# Patient Record
Sex: MALE | Race: WHITE | Employment: OTHER | URBAN - METROPOLITAN AREA
[De-identification: names, ages, dates, MRNs, and addresses within clinical notes are randomized per-mention and may not be internally consistent; named-entity substitution may affect disease eponyms.]

---

## 2022-11-22 PROBLEM — R97.20 ELEVATED PSA: Status: ACTIVE | Noted: 2022-11-22

## 2022-11-22 ASSESSMENT — ENCOUNTER SYMPTOMS
VOMITING: 0
NAUSEA: 0
EYE REDNESS: 0
SHORTNESS OF BREATH: 0
ABDOMINAL PAIN: 0
COUGH: 0
WHEEZING: 0
CONSTIPATION: 0
BACK PAIN: 0
COLOR CHANGE: 0

## 2022-11-22 NOTE — PROGRESS NOTES
HPI:          Patient is a 79 y.o. male in no acute distress. He is alert and oriented to person, place, and time. Patient being seen here today as a new patient. Patient was referred here for elevated PSA. Patient did have recent PSA value of 19.82. Patient has never had any PSA testing prior to this. He reports no new or worsening lower urinary tract symptoms. He has no issues with urgency and frequency. He has no gross hematuria dysuria. He reports no unintentional weight loss or decreased appetite. Patient has never seen urology in the past.  He has no significant family history of prostate cancer. He has no pain today. PSA  10/2022 - 19.82      Past Medical History:   Diagnosis Date    Elevated PSA      Past Surgical History:   Procedure Laterality Date    COLONOSCOPY      2005    FETAL SURGERY FOR CONGENITAL HERNIA Left     2012 Groin region    HERNIA REPAIR       Outpatient Encounter Medications as of 11/23/2022   Medication Sig Dispense Refill    Cyanocobalamin (VITAMIN B 12 PO) Take by mouth Indications: liquid      sulfamethoxazole-trimethoprim (BACTRIM DS;SEPTRA DS) 800-160 MG per tablet Take 1 tablet by mouth 2 times daily for 14 days 28 tablet 0     No facility-administered encounter medications on file as of 11/23/2022. Current Outpatient Medications on File Prior to Visit   Medication Sig Dispense Refill    Cyanocobalamin (VITAMIN B 12 PO) Take by mouth Indications: liquid       No current facility-administered medications on file prior to visit. Patient has no known allergies. Family History   Problem Relation Age of Onset    Other Father     No Known Problems Mother      Social History     Tobacco Use   Smoking Status Never   Smokeless Tobacco Never       Social History     Substance and Sexual Activity   Alcohol Use Yes    Comment: 2 beers per day       Review of Systems   Constitutional:  Negative for appetite change, chills and fever.    Eyes:  Negative for redness and visual disturbance. Respiratory:  Negative for cough, shortness of breath and wheezing. Cardiovascular:  Negative for chest pain and leg swelling. Gastrointestinal:  Negative for abdominal pain, constipation, nausea and vomiting. Genitourinary:  Negative for decreased urine volume, difficulty urinating, dysuria, enuresis, flank pain, frequency, hematuria, penile discharge, penile pain, scrotal swelling, testicular pain and urgency. Musculoskeletal:  Negative for back pain, joint swelling and myalgias. Skin:  Negative for color change, rash and wound. Neurological:  Negative for dizziness, tremors and numbness. Hematological:  Negative for adenopathy. Does not bruise/bleed easily. /78 (Site: Right Upper Arm, Position: Sitting, Cuff Size: Medium Adult)   Temp 97.5 °F (36.4 °C) (Infrared)   Ht 5' 9\" (1.753 m)   Wt 229 lb (103.9 kg)   BMI 33.82 kg/m²       PHYSICAL EXAM:  Constitutional: Patient in no acute distress; Neuro: alert and oriented to person place and time. Psych: Mood and affect normal.  Skin: Normal  Lungs: Respiratory effort normal  Cardiovascular:  Normal peripheral pulses  Abdomen: Soft, non-tender, non-distended with no CVA, flank pain  Bladder non-tender and not distended. Lymphatics: no palpable lymphadenopathy  Penis normal  Urethral meatus normal  Scrotal exam normal  Testicles normal bilaterally  Epididymis normal bilaterally  No evidence of inguinal hernia      No results found for: BUN  No results found for: CREATININE  No results found for: PSA    ASSESSMENT:  This is a 79 y.o. male with the following diagnoses:   Diagnosis Orders   1. Elevated PSA  PSA, Diagnostic    sulfamethoxazole-trimethoprim (BACTRIM DS;SEPTRA DS) 800-160 MG per tablet      2.  BPH with obstruction/lower urinary tract symptoms  CT MEASUREMENT,POST-VOID RESIDUAL VOLUME BY US,NON-IMAGING    PSA, Diagnostic    sulfamethoxazole-trimethoprim (BACTRIM DS;SEPTRA DS) 800-160 MG per tablet           PLAN:  We discussed several options today. At this point time patient would like to try 2 weeks of antibiotics as well as a repeat PSA in approximately 4 weeks.

## 2022-11-23 ENCOUNTER — OFFICE VISIT (OUTPATIENT)
Dept: UROLOGY | Age: 67
End: 2022-11-23
Payer: MEDICARE

## 2022-11-23 VITALS
HEIGHT: 69 IN | TEMPERATURE: 97.5 F | BODY MASS INDEX: 33.92 KG/M2 | DIASTOLIC BLOOD PRESSURE: 78 MMHG | WEIGHT: 229 LBS | SYSTOLIC BLOOD PRESSURE: 122 MMHG

## 2022-11-23 DIAGNOSIS — N13.8 BPH WITH OBSTRUCTION/LOWER URINARY TRACT SYMPTOMS: ICD-10-CM

## 2022-11-23 DIAGNOSIS — R97.20 ELEVATED PSA: Primary | ICD-10-CM

## 2022-11-23 DIAGNOSIS — N40.1 BPH WITH OBSTRUCTION/LOWER URINARY TRACT SYMPTOMS: ICD-10-CM

## 2022-11-23 PROCEDURE — 3017F COLORECTAL CA SCREEN DOC REV: CPT | Performed by: UROLOGY

## 2022-11-23 PROCEDURE — 1123F ACP DISCUSS/DSCN MKR DOCD: CPT | Performed by: UROLOGY

## 2022-11-23 PROCEDURE — PBSHW PR MEASUREMENT,POST-VOID RESIDUAL VOLUME BY US,NON-IMAGING: Performed by: UROLOGY

## 2022-11-23 PROCEDURE — 1036F TOBACCO NON-USER: CPT | Performed by: UROLOGY

## 2022-11-23 PROCEDURE — 51798 US URINE CAPACITY MEASURE: CPT | Performed by: UROLOGY

## 2022-11-23 PROCEDURE — 99204 OFFICE O/P NEW MOD 45 MIN: CPT | Performed by: UROLOGY

## 2022-11-23 PROCEDURE — G8417 CALC BMI ABV UP PARAM F/U: HCPCS | Performed by: UROLOGY

## 2022-11-23 PROCEDURE — G8484 FLU IMMUNIZE NO ADMIN: HCPCS | Performed by: UROLOGY

## 2022-11-23 PROCEDURE — G8427 DOCREV CUR MEDS BY ELIG CLIN: HCPCS | Performed by: UROLOGY

## 2022-11-23 RX ORDER — SULFAMETHOXAZOLE AND TRIMETHOPRIM 800; 160 MG/1; MG/1
1 TABLET ORAL 2 TIMES DAILY
Qty: 28 TABLET | Refills: 0 | Status: SHIPPED | OUTPATIENT
Start: 2022-11-23 | End: 2022-12-07

## 2022-11-23 NOTE — PATIENT INSTRUCTIONS
SURVEY:    You may be receiving a survey from [employer] regarding your visit today. Please complete the survey to enable us to provide the highest quality of care to you and your family. If you cannot score us a very good on any question, please call the office to discuss how we could have made your experience a very good one. Thank you.

## 2022-12-29 ENCOUNTER — OFFICE VISIT (OUTPATIENT)
Dept: UROLOGY | Age: 67
End: 2022-12-29
Payer: MEDICARE

## 2022-12-29 VITALS — DIASTOLIC BLOOD PRESSURE: 85 MMHG | HEART RATE: 67 BPM | SYSTOLIC BLOOD PRESSURE: 127 MMHG

## 2022-12-29 DIAGNOSIS — R97.20 ELEVATED PSA: Primary | ICD-10-CM

## 2022-12-29 PROCEDURE — 1036F TOBACCO NON-USER: CPT | Performed by: NURSE PRACTITIONER

## 2022-12-29 PROCEDURE — 99211 OFF/OP EST MAY X REQ PHY/QHP: CPT

## 2022-12-29 PROCEDURE — G8484 FLU IMMUNIZE NO ADMIN: HCPCS | Performed by: NURSE PRACTITIONER

## 2022-12-29 PROCEDURE — G8417 CALC BMI ABV UP PARAM F/U: HCPCS | Performed by: NURSE PRACTITIONER

## 2022-12-29 PROCEDURE — 3017F COLORECTAL CA SCREEN DOC REV: CPT | Performed by: NURSE PRACTITIONER

## 2022-12-29 PROCEDURE — 99214 OFFICE O/P EST MOD 30 MIN: CPT | Performed by: NURSE PRACTITIONER

## 2022-12-29 PROCEDURE — 1123F ACP DISCUSS/DSCN MKR DOCD: CPT | Performed by: NURSE PRACTITIONER

## 2022-12-29 PROCEDURE — G8427 DOCREV CUR MEDS BY ELIG CLIN: HCPCS | Performed by: NURSE PRACTITIONER

## 2022-12-29 RX ORDER — SULFAMETHOXAZOLE AND TRIMETHOPRIM 800; 160 MG/1; MG/1
1 TABLET ORAL 2 TIMES DAILY
Qty: 6 TABLET | Refills: 0 | Status: SHIPPED | OUTPATIENT
Start: 2022-12-29 | End: 2023-01-01

## 2022-12-29 ASSESSMENT — ENCOUNTER SYMPTOMS
BACK PAIN: 0
WHEEZING: 0
ABDOMINAL PAIN: 0
VOMITING: 0
EYE REDNESS: 0
NAUSEA: 0
COLOR CHANGE: 0
COUGH: 0
SHORTNESS OF BREATH: 0
CONSTIPATION: 0

## 2022-12-29 NOTE — PROGRESS NOTES
HPI:          Patient is a 79 y.o. male in no acute distress. He is alert and oriented to person, place, and time. History  11/2022 referred for elevated PSA. He reports no new or worsening lower urinary tract symptoms. He has no issues with urgency and frequency. He has no gross hematuria dysuria. He reports no unintentional weight loss or decreased appetite. Patient has never seen urology in the past.  He has no significant family history of prostate cancer. He has no pain today. PSA  12/2022 - 14.90  10/2022 - 19.82    Today  Here today to follow-up for elevated PSA. He did complete a course of antibiotics for a PSA of 19.82. Unfortunately, his PSA only declined to 14.90. He denies unintentional weight loss, decreased energy or appetite, new or worsening bone/hip/back pain. He denies any lower extremity numbness and tingling. He denies new or worsening LUTS. He denies gross hematuria or dysuria. Past Medical History:   Diagnosis Date    Elevated PSA      Past Surgical History:   Procedure Laterality Date    COLONOSCOPY      2005    FETAL SURGERY FOR CONGENITAL HERNIA Left     2012 Groin region    HERNIA REPAIR       Outpatient Encounter Medications as of 12/29/2022   Medication Sig Dispense Refill    sulfamethoxazole-trimethoprim (BACTRIM DS;SEPTRA DS) 800-160 MG per tablet Take 1 tablet by mouth 2 times daily for 3 days Start the day before biopsy 6 tablet 0    Cyanocobalamin (VITAMIN B 12 PO) Take by mouth Indications: liquid       No facility-administered encounter medications on file as of 12/29/2022. Current Outpatient Medications on File Prior to Visit   Medication Sig Dispense Refill    Cyanocobalamin (VITAMIN B 12 PO) Take by mouth Indications: liquid       No current facility-administered medications on file prior to visit. Patient has no known allergies.   Family History   Problem Relation Age of Onset    Other Father     No Known Problems Mother      Social History Tobacco Use   Smoking Status Never   Smokeless Tobacco Never       Social History     Substance and Sexual Activity   Alcohol Use Yes    Comment: 2 beers per day       Review of Systems   Constitutional:  Negative for appetite change, chills and fever. Eyes:  Negative for redness and visual disturbance. Respiratory:  Negative for cough, shortness of breath and wheezing. Cardiovascular:  Negative for chest pain and leg swelling. Gastrointestinal:  Negative for abdominal pain, constipation, nausea and vomiting. Genitourinary:  Negative for decreased urine volume, difficulty urinating, dysuria, enuresis, flank pain, frequency, hematuria, penile discharge, penile pain, scrotal swelling, testicular pain and urgency. Musculoskeletal:  Negative for back pain, joint swelling and myalgias. Skin:  Negative for color change, rash and wound. Neurological:  Negative for dizziness, tremors and numbness. Hematological:  Negative for adenopathy. Does not bruise/bleed easily. /85 (Site: Right Upper Arm, Position: Sitting, Cuff Size: Large Adult)   Pulse 67       PHYSICAL EXAM:  Constitutional: Patient in no acute distress; Neuro: alert and oriented to person place and time. Psych: Mood and affect normal.  Skin: Normal  Lungs: Respiratory effort normal      No results found for: BUN  No results found for: CREATININE  No results found for: PSA    ASSESSMENT:   Diagnosis Orders   1. Elevated PSA              PLAN:  We discussed that due to his elevated PSA we recommend a prostate biopsy. The patient was told this was typically done with ultrasound guidance and a prostate block with local anesthesia to minimize the discomfort. I discussed the risks including infection, bleeding, hematospermia and rarely sepsis. He was encouraged to give himself a Fleets enema the night prior to the biopsy and to take his antibiotics as instructed.   He was told to stop taking ASA and other blood thinners at least a week prior to the biopsy. He was told to go the ER if he experiences fever 100.1F or greater, chills or severe bleeding after the biopsy. Patient amenable to schedule biopsy.

## 2023-01-12 ENCOUNTER — HOSPITAL ENCOUNTER (OUTPATIENT)
Age: 68
Setting detail: SPECIMEN
Discharge: HOME | End: 2023-01-12
Payer: MEDICARE

## 2023-01-12 ENCOUNTER — PROCEDURE VISIT (OUTPATIENT)
Dept: UROLOGY | Age: 68
End: 2023-01-12

## 2023-01-12 VITALS
TEMPERATURE: 98.6 F | HEIGHT: 69 IN | WEIGHT: 223 LBS | SYSTOLIC BLOOD PRESSURE: 115 MMHG | DIASTOLIC BLOOD PRESSURE: 76 MMHG | HEART RATE: 83 BPM | BODY MASS INDEX: 33.03 KG/M2

## 2023-01-12 DIAGNOSIS — R97.20 ELEVATED PSA: ICD-10-CM

## 2023-01-12 DIAGNOSIS — R97.20 ELEVATED PSA: Primary | ICD-10-CM

## 2023-01-12 PROCEDURE — 88305 TISSUE EXAM BY PATHOLOGIST: CPT

## 2023-01-12 ASSESSMENT — ENCOUNTER SYMPTOMS
COUGH: 0
BACK PAIN: 0
VOMITING: 0
EYE REDNESS: 0
WHEEZING: 0
ABDOMINAL PAIN: 0
SHORTNESS OF BREATH: 0
NAUSEA: 0
COLOR CHANGE: 0
CONSTIPATION: 0

## 2023-01-12 NOTE — PROGRESS NOTES
The following was used during the prostate biopsy without adverse affects:    Schuyler [place]aks  Lot number [id number]  Expiration date 2025-05-31      BIOPSY GUIDE  Lot number [id number]  Expiration date 2025-03-31      LIDOCAINE 2% INJECTABLE  Lot number [id number]  Expiration date 08/25

## 2023-01-12 NOTE — PATIENT INSTRUCTIONS
SURVEY:    You may be receiving a survey from [email or web address] regarding your visit today. Please complete the survey to enable us to provide the highest quality of care to you and your family. If you cannot score us a very good on any question, please call the office to discuss how we could have made your experience a very good one. Thank you. GABI    Thank you for enrolling in [address] Please follow the instructions below to securely access your online medical record. [employer] allows you to send messages to your doctor, view your test results, renew your prescriptions, schedule appointments, and more. How Do I Sign Up? In your Internet browser, go to [email or web address]  Click on the Sign Up Now link in the Sign In box. You will see the New Member Sign Up page. Enter your [employer] Access Code exactly as it appears below. You will not need to use this code after youve completed the sign-up process. If you do not sign up before the expiration date, you must request a new code. [employer] Access Code: [email or web address]  Expires: 2/24/2023  7:03 AM    Enter your Social Security Number ([id number]) and Date of Birth (mm/dd/yyyy) as indicated and click Submit. You will be taken to the next sign-up page. Create a [employer] ID. This will be your [employer] login ID and cannot be changed, so think of one that is secure and easy to remember. Create a [employer] password. You can change your password at any time. Enter your Password Reset Question and Answer. This can be used at a later time if you forget your password. Enter your e-mail address. You will receive e-mail notification when new information is available in [address] Click Sign Up. You can now view your medical record. Additional Information  If you have questions, please contact your physician practice where you receive care. Remember, [employer] is NOT to be used for urgent needs. For medical emergencies, dial 911.

## 2023-01-13 ENCOUNTER — TELEPHONE (OUTPATIENT)
Dept: UROLOGY | Age: 68
End: 2023-01-13

## 2023-01-13 LAB — SURGICAL PATHOLOGY REPORT: NORMAL

## 2023-01-13 RX ORDER — CIPROFLOXACIN 500 MG/1
500 TABLET, FILM COATED ORAL 2 TIMES DAILY
Qty: 14 TABLET | Refills: 0 | Status: SHIPPED | OUTPATIENT
Start: 2023-01-13 | End: 2023-01-20

## 2023-01-13 RX ORDER — SULFAMETHOXAZOLE AND TRIMETHOPRIM 800; 160 MG/1; MG/1
TABLET ORAL
COMMUNITY
Start: 2023-01-05

## 2023-01-13 NOTE — TELEPHONE ENCOUNTER
Contacted the patient and advised of the provider's response/phone message. Patient voiced understanding. He mentioned he has already completed all but 1 of the Bactrim tabs. He questions the need to pickup the prescription for the Cipro. Advised the patient the providers would be informed, he voiced understanding.

## 2023-01-13 NOTE — TELEPHONE ENCOUNTER
Patient contacted the office today questioning if he should be placed on a different antibiotic following the biopsy procedure. Yesterday following the procedure he was vomiting around 11 am and then again around 6/7 pm. He mentioned he had taken the medication on an empty stomach both times when he was experiencing vomiting. No symptoms of fever or chills. He went on to explain he had been taking the antibiotic- Bactrim 800 mg x 1 week prior to the procedure. He had not previously experienced problems with vomiting while on the medication. He did mention symptoms of fatigue and lethargy. Should patient stop the Bactrim and be placed on something different? [facility] [address]. Please advise. Thank You!

## 2023-01-13 NOTE — TELEPHONE ENCOUNTER
Please let him know that we are putting him on additional antibiotics because he told us that he was feeling fatigued and had lethargy. We do not want him to have an infection after this procedure.

## 2023-01-13 NOTE — TELEPHONE ENCOUNTER
Please let him know to stop the Bactrim. We do have concern that he does have lethargy and fatigue. We will place him on a 7-day course of Cipro. Please take the Cipro with food. If he develops fever and chills he needs to go to the emergency room. Please have him give us a call early next week to let us know how he is doing.

## 2023-01-20 ENCOUNTER — OFFICE VISIT (OUTPATIENT)
Dept: UROLOGY | Age: 68
End: 2023-01-20

## 2023-01-20 VITALS
TEMPERATURE: 97.7 F | HEIGHT: 69 IN | BODY MASS INDEX: 33.18 KG/M2 | SYSTOLIC BLOOD PRESSURE: 124 MMHG | DIASTOLIC BLOOD PRESSURE: 82 MMHG | WEIGHT: 224 LBS

## 2023-01-20 DIAGNOSIS — C61 PROSTATE CANCER (HCC): Primary | ICD-10-CM

## 2023-01-20 ASSESSMENT — ENCOUNTER SYMPTOMS
EYE REDNESS: 0
NAUSEA: 0
ABDOMINAL PAIN: 0
BACK PAIN: 0
WHEEZING: 0
COUGH: 0
SHORTNESS OF BREATH: 0
COLOR CHANGE: 0
VOMITING: 0
CONSTIPATION: 0

## 2023-01-20 NOTE — PATIENT INSTRUCTIONS
SURVEY:    You may be receiving a survey from [employer] regarding your visit today. Please complete the survey to enable us to provide the highest quality of care to you and your family. If you cannot score us a very good on any question, please call the office to discuss how we could have made your experience a very good one. Thank you.

## 2023-01-20 NOTE — PROGRESS NOTES
HPI:          Patient is a 79 y.o. male in no acute distress. He is alert and oriented to person, place, and time. History  11/2022 referred for elevated PSA. He reports no new or worsening lower urinary tract symptoms. He has no issues with urgency and frequency. He has no gross hematuria dysuria. He reports no unintentional weight loss or decreased appetite. Patient has never seen urology in the past.  He has no significant family history of prostate cancer. He has no pain today. PSA  12/2022 - 14.90  10/2022 - 19.82        Currently  Patient is here today to review recent prostate biopsy results. Patient did have 1 core positive Luis 3+3 equal 6. This was only 5% of the score. At the time of the biopsy patient's PSA did remain high of 14.9. This had come down over the previous 2 months. Currently patient is doing well. No recent gross hematuria or dysuria. He has no new onset flank pain nausea vomiting. No lower extremity pain or swelling. He reports no pain today. Past Medical History:   Diagnosis Date    Elevated PSA      Past Surgical History:   Procedure Laterality Date    COLONOSCOPY      2005    FETAL SURGERY FOR CONGENITAL HERNIA Left     2012 Groin region    HERNIA REPAIR       Outpatient Encounter Medications as of 1/20/2023   Medication Sig Dispense Refill    Cyanocobalamin (VITAMIN B 12 PO) Take by mouth Indications: liquid      [DISCONTINUED] sulfamethoxazole-trimethoprim (BACTRIM DS;SEPTRA DS) 800-160 MG per tablet  (Patient not taking: Reported on 1/20/2023)      [DISCONTINUED] ciprofloxacin (CIPRO) 500 MG tablet Take 1 tablet by mouth 2 times daily for 7 days (Patient not taking: Reported on 1/20/2023) 14 tablet 0     No facility-administered encounter medications on file as of 1/20/2023.       Current Outpatient Medications on File Prior to Visit   Medication Sig Dispense Refill    Cyanocobalamin (VITAMIN B 12 PO) Take by mouth Indications: liquid       No current facility-administered medications on file prior to visit. Sulfa antibiotics  Family History   Problem Relation Age of Onset    Other Father     No Known Problems Mother      Social History     Tobacco Use   Smoking Status Never   Smokeless Tobacco Never       Social History     Substance and Sexual Activity   Alcohol Use Yes    Comment: 2 beers per day       Review of Systems   Constitutional:  Negative for appetite change, chills and fever. Eyes:  Negative for redness and visual disturbance. Respiratory:  Negative for cough, shortness of breath and wheezing. Cardiovascular:  Negative for chest pain and leg swelling. Gastrointestinal:  Negative for abdominal pain, constipation, nausea and vomiting. Genitourinary:  Negative for decreased urine volume, difficulty urinating, dysuria, enuresis, flank pain, frequency, hematuria, penile discharge, penile pain, scrotal swelling, testicular pain and urgency. Musculoskeletal:  Negative for back pain, joint swelling and myalgias. Skin:  Negative for color change, rash and wound. Neurological:  Negative for dizziness, tremors and numbness. Hematological:  Negative for adenopathy. Does not bruise/bleed easily. /82 (Site: Right Upper Arm, Position: Sitting, Cuff Size: Large Adult)   Temp 97.7 °F (36.5 °C)   Ht 5' 9\" (1.753 m)   Wt 224 lb (101.6 kg)   BMI 33.08 kg/m²       PHYSICAL EXAM:  Constitutional: Patient in no acute distress; Neuro: alert and oriented to person place and time. Psych: Mood and affect normal.  Skin: Normal  Lungs: Respiratory effort normal  Cardiovascular:  Normal peripheral pulses  Abdomen: Soft, non-tender, non-distended with no CVA, flank pain  Bladder non-tender and not distended.   Lymphatics: no palpable lymphadenopathy  Penis normal  Urethral meatus normal  Scrotal exam normal  Testicles normal bilaterally  Epididymis normal bilaterally  No evidence of inguinal hernia      No results found for: BUN  No results found for: CREATININE  No results found for: PSA    ASSESSMENT:  This is a 79 y.o. male with the following diagnoses:   Diagnosis Orders   1. Prostate cancer ([address]  PSA, Diagnostic           PLAN:  At this point time it is difficult to decide on definitive therapy with the patient. We did opt to repeat his PSA in approximately 6 weeks. If we do see a reduction in his PSA. We may opt to do an additional round of antibiotics and repeat the PSA again putting the patient in an active surveillance protocol. We will make a more definitive therapy decision when we were turned in 6 weeks. Spent 35 mins, >50% time face-to-face, discussing diagnoses, any applicable test results, treatment plan/options, and prognosis.

## 2023-03-01 DIAGNOSIS — C61 PROSTATE CANCER (HCC): ICD-10-CM

## 2023-03-03 ENCOUNTER — OFFICE VISIT (OUTPATIENT)
Dept: UROLOGY | Age: 68
End: 2023-03-03
Payer: MEDICARE

## 2023-03-03 VITALS
HEART RATE: 70 BPM | DIASTOLIC BLOOD PRESSURE: 89 MMHG | HEIGHT: 69 IN | TEMPERATURE: 98.2 F | WEIGHT: 234 LBS | SYSTOLIC BLOOD PRESSURE: 117 MMHG | BODY MASS INDEX: 34.66 KG/M2

## 2023-03-03 DIAGNOSIS — C61 PROSTATE CANCER (HCC): Primary | ICD-10-CM

## 2023-03-03 PROCEDURE — 51798 US URINE CAPACITY MEASURE: CPT | Performed by: UROLOGY

## 2023-03-03 ASSESSMENT — ENCOUNTER SYMPTOMS
ABDOMINAL PAIN: 0
SHORTNESS OF BREATH: 0
WHEEZING: 0
EYE REDNESS: 0
VOMITING: 0
BACK PAIN: 0
NAUSEA: 0
COUGH: 0
COLOR CHANGE: 0
CONSTIPATION: 0

## 2023-03-03 NOTE — PATIENT INSTRUCTIONS
SURVEY:    You may be receiving a survey from [employer] regarding your visit today. Please complete the survey to enable us to provide the highest quality of care to you and your family. If you cannot score us a very good on any question, please call the office to discuss how we could have made your experience a very good one. Thank you.

## 2023-03-03 NOTE — PROGRESS NOTES
HPI:          Patient is a 79 y.o. male in no acute distress. He is alert and oriented to person, place, and time. History  11/2022 referred for elevated PSA. He reports no new or worsening lower urinary tract symptoms. He has no issues with urgency and frequency. He has no gross hematuria dysuria. He reports no unintentional weight loss or decreased appetite. Patient has never seen urology in the past.  He has no significant family history of prostate cancer. He has no pain today. PSA  3/2023 - 19.75  12/2022 - 14.90  10/2022 - 19.82        Currently  Patient is here today for 6-week follow-up. Last visit we did diagnose him with cancer of the prostate. He did have 1 core positive for Lane 3+3. At that point time we did feel like it was warranted for repeat PSA. Patient to get a repeat PSA today. The current value is 19.75. Patient has no new symptoms. No pain today. Past Medical History:   Diagnosis Date    Elevated PSA      Past Surgical History:   Procedure Laterality Date    COLONOSCOPY      2005    FETAL SURGERY FOR CONGENITAL HERNIA Left     2012 Groin region    HERNIA REPAIR       Outpatient Encounter Medications as of 3/3/2023   Medication Sig Dispense Refill    Cyanocobalamin (VITAMIN B 12 PO) Take by mouth Indications: liquid       No facility-administered encounter medications on file as of 3/3/2023. Current Outpatient Medications on File Prior to Visit   Medication Sig Dispense Refill    Cyanocobalamin (VITAMIN B 12 PO) Take by mouth Indications: liquid       No current facility-administered medications on file prior to visit.      Sulfa antibiotics  Family History   Problem Relation Age of Onset    Other Father     No Known Problems Mother      Social History     Tobacco Use   Smoking Status Never   Smokeless Tobacco Never       Social History     Substance and Sexual Activity   Alcohol Use Yes    Comment: 2 beers per day       Review of Systems   Constitutional: Negative for appetite change, chills and fever. Eyes:  Negative for redness and visual disturbance. Respiratory:  Negative for cough, shortness of breath and wheezing. Cardiovascular:  Negative for chest pain and leg swelling. Gastrointestinal:  Negative for abdominal pain, constipation, nausea and vomiting. Genitourinary:  Negative for decreased urine volume, difficulty urinating, dysuria, enuresis, flank pain, frequency, hematuria, penile discharge, penile pain, scrotal swelling, testicular pain and urgency. Musculoskeletal:  Negative for back pain, joint swelling and myalgias. Skin:  Negative for color change, rash and wound. Neurological:  Negative for dizziness, tremors and numbness. Hematological:  Negative for adenopathy. Does not bruise/bleed easily. /89 (Site: Right Upper Arm, Position: Standing, Cuff Size: Large Adult)   Pulse 70   Temp 98.2 °F (36.8 °C)   Ht 5' 9\" (1.753 m)   Wt 234 lb (106.1 kg)   BMI 34.56 kg/m²       PHYSICAL EXAM:  Constitutional: Patient in no acute distress; Neuro: alert and oriented to person place and time. Psych: Mood and affect normal.  Skin: Normal  Lungs: Respiratory effort normal  Cardiovascular:  Normal peripheral pulses  Abdomen: Soft, non-tender, non-distended with no CVA, flank pain  Bladder non-tender and not distended. Lymphatics: no palpable lymphadenopathy  Penis normal  Urethral meatus normal  Scrotal exam normal  Testicles normal bilaterally  Epididymis normal bilaterally  No evidence of inguinal hernia        No results found for: BUN  No results found for: CREATININE  No results found for: PSA    ASSESSMENT:  This is a 79 y.o. male with the following diagnoses:   Diagnosis Orders   1. Prostate cancer ([facility] [address]  IL EZ POST-VOIDING RESIDUAL URINE&/BLADDER CAP    PET CT TUMOR IMAGE SKULL THIGH PSMA           PLAN:  We did have a long discussion today about treatment options.   I do think at this point time since the PSA has not moved down significantly that he will need treatment. I do think where his PSA sits that we will need to order a PSMA PET study. Over this. He will follow-up afterwards and we can decide on treatment course.

## 2023-03-10 ENCOUNTER — HOSPITAL ENCOUNTER (OUTPATIENT)
Dept: NUCLEAR MEDICINE | Age: 68
End: 2023-03-10
Payer: MEDICARE

## 2023-03-10 DIAGNOSIS — C61 PROSTATE CANCER (HCC): ICD-10-CM

## 2023-03-10 PROCEDURE — 78815 PET IMAGE W/CT SKULL-THIGH: CPT

## 2023-03-10 PROCEDURE — A9595 HC RX CONTRAST MEDICATION: HCPCS

## 2023-03-10 PROCEDURE — 6360000004 HC RX CONTRAST MEDICATION

## 2023-03-10 PROCEDURE — A9595 HC RX CONTRAST MEDICATION: HCPCS | Performed by: UROLOGY

## 2023-03-10 PROCEDURE — 6360000004 HC RX CONTRAST MEDICATION: Performed by: UROLOGY

## 2023-03-10 PROCEDURE — 2580000003 HC RX 258: Performed by: UROLOGY

## 2023-03-10 RX ORDER — SODIUM CHLORIDE 0.9 % (FLUSH) 0.9 %
10 SYRINGE (ML) INJECTION PRN
Status: DISCONTINUED | OUTPATIENT
Start: 2023-03-10 | End: 2023-03-13 | Stop reason: HOSPADM

## 2023-03-10 RX ADMIN — PIFLUFOLASTAT F-18 10.88 MILLICURIE: 80 INJECTION INTRAVENOUS at 12:37

## 2023-03-10 RX ADMIN — SODIUM CHLORIDE, PRESERVATIVE FREE 10 ML: 5 INJECTION INTRAVENOUS at 12:37

## 2023-03-16 ENCOUNTER — OFFICE VISIT (OUTPATIENT)
Dept: UROLOGY | Age: 68
End: 2023-03-16
Payer: MEDICARE

## 2023-03-16 VITALS
BODY MASS INDEX: 33.92 KG/M2 | WEIGHT: 229 LBS | DIASTOLIC BLOOD PRESSURE: 83 MMHG | HEIGHT: 69 IN | TEMPERATURE: 98.2 F | HEART RATE: 74 BPM | SYSTOLIC BLOOD PRESSURE: 120 MMHG

## 2023-03-16 DIAGNOSIS — C61 PROSTATE CANCER (HCC): Primary | ICD-10-CM

## 2023-03-16 PROCEDURE — G8484 FLU IMMUNIZE NO ADMIN: HCPCS | Performed by: NURSE PRACTITIONER

## 2023-03-16 PROCEDURE — 1123F ACP DISCUSS/DSCN MKR DOCD: CPT | Performed by: NURSE PRACTITIONER

## 2023-03-16 PROCEDURE — G8427 DOCREV CUR MEDS BY ELIG CLIN: HCPCS | Performed by: NURSE PRACTITIONER

## 2023-03-16 PROCEDURE — 99215 OFFICE O/P EST HI 40 MIN: CPT | Performed by: NURSE PRACTITIONER

## 2023-03-16 PROCEDURE — 99211 OFF/OP EST MAY X REQ PHY/QHP: CPT | Performed by: NURSE PRACTITIONER

## 2023-03-16 PROCEDURE — G8417 CALC BMI ABV UP PARAM F/U: HCPCS | Performed by: NURSE PRACTITIONER

## 2023-03-16 PROCEDURE — 1036F TOBACCO NON-USER: CPT | Performed by: NURSE PRACTITIONER

## 2023-03-16 PROCEDURE — 3017F COLORECTAL CA SCREEN DOC REV: CPT | Performed by: NURSE PRACTITIONER

## 2023-03-16 NOTE — PATIENT INSTRUCTIONS
SURVEY:    You may be receiving a survey from [employer] regarding your visit today. Please complete the survey to enable us to provide the highest quality of care to you and your family. If you cannot score us a very good on any question, please call the office to discuss how we could have made your experience a very good one. Thank you.

## 2023-03-16 NOTE — PROGRESS NOTES
History  11/2022 referred for elevated PSA. He reports no new or worsening lower urinary tract symptoms. He has no issues with urgency and frequency. He has no gross hematuria dysuria. He reports no unintentional weight loss or decreased appetite. Patient has never seen urology in the past.  He has no significant family history of prostate cancer. He has no pain today. PSA  3/2023 - 19.75  12/2022 - 14.90  10/2022 - 19.82    1/2023 prostate biopsy for PSA of 19.75. Pathology: Luis 3+3=6 (grade group 1), involving 5%. Prostate volume 37 grams          Today  Here today to follow-up for prostate cancer. He is here with his wife today. We did repeat his PSA due to very low Climax score on biopsy, but PSA was 19.75. He had a very small prostate and noted on ultrasound, 37 g. He is prostate volume, Luis score, and PSA are not consistent. Due to the high PSA we did obtain a PSMA scan. This film was independently reviewed and showed no evidence of metastatic prostate cancer. There is PSMA activity in the anterior right aspect of the prostate gland that correlates with prostate biopsy. Plan  He is very interested in proceeding with CyberKnife therapy for prostate cancer. He did tell him that this is not currently in our guidelines as first-line treatment for prostate cancer. He is against surgery at this time due to the potential side effects. I have discussed in great detail with the patient the natural history, diagnosis and treatment of prostate cancer. I explained the Climax grading system as well as the various treatments available for prostate cancer. I discussed all of the following options:    Patient told that IMRT is given 5 days a week and the side effects include rectal and bladder injury (OAB), erectile dysfunction (ED) and incontinence. Long term the patient may experience hematuria and radiation cystitis.   Brachytherapy is done as an outpatient procedure under general anesthesia and postop the patient may experience dysuria, urgency, frequency, urge incontinence, increasing obstructive voiding symptoms and to a lesser degree than IMRT, rectal radiation injury and ED. Patients may require concomitant hormonal therapy with IMRT depending on the grade and extent of cancer. The patient may require hormonal therapy to downsize the prostate gland prior to brachytherapy. Robotic assisted laparoscopic radical prostatectomy. Patient told about the options of open vs. Robotic assisted laparoscopic prostatectomy with or without pelvic lymphadenectomy. I discussed the risks including infection, bleeding, the risks of anesthesia, DVT, PE, MI, stroke, death, rectal injury and urethral stricture/bladder neck contracture. I discussed the side effect of stress urinary incontinence which is temporary for most patients. I discussed the side effect of ED and the fact that it may take 6-18 months to recover this function. He did seem to have several questions based on inaccurate information. I did urge him to meet with both a radiation oncologist and a robotic surgeon to be educated on his options for his prostate cancer. We did explain that we are concerned about his very high PSA and the disconnect between his Luis score and percentage of positive biopsy. I did stress the importance of making an informed, educated decision. His wife who was present was very interested in meeting with these specialist.  At the end of the conversation patient was still very interested in CyberKnife technology.

## 2023-03-29 ENCOUNTER — TELEPHONE (OUTPATIENT)
Dept: UROLOGY | Age: 68
End: 2023-03-29

## 2023-05-19 ENCOUNTER — TELEPHONE (OUTPATIENT)
Dept: UROLOGY | Age: 68
End: 2023-05-19

## 2023-06-09 ENCOUNTER — TELEPHONE (OUTPATIENT)
Dept: UROLOGY | Age: 68
End: 2023-06-09

## 2024-08-21 ENCOUNTER — TELEPHONE (OUTPATIENT)
Dept: UROLOGY | Age: 69
End: 2024-08-21

## 2024-08-22 NOTE — TELEPHONE ENCOUNTER
At this time the patient does not wish to schedule a follow up appointment at this time. He stated his PSA is good right now. He did say if he needs anything in the future he will call.   
Is he going to return to our office for f/u, if so he needs scheduled  
Radiation Oncology visit note [id number].                     
Writer attempted to contact patient and was unsuccessful. I did leave a detailed message asking the patient to return our office call.   
noted  
no